# Patient Record
Sex: MALE
[De-identification: names, ages, dates, MRNs, and addresses within clinical notes are randomized per-mention and may not be internally consistent; named-entity substitution may affect disease eponyms.]

---

## 2017-10-30 ENCOUNTER — CHARTING TRANS (OUTPATIENT)
Dept: OTHER | Age: 37
End: 2017-10-30

## 2017-10-30 ENCOUNTER — LAB SERVICES (OUTPATIENT)
Dept: OTHER | Age: 37
End: 2017-10-30

## 2017-10-30 LAB
CHOLEST SERPL-MCNC: 167
CHOLEST/HDLC SERPL: 3.7
GLUCOSE: 77
HDLC SERPL-MCNC: 46
LDLC SERPL CALC-MCNC: 104
LENGTH OF FAST TIME PATIENT: NO
NONHDLC SERPL-MCNC: 121
TRIGL SERPL-MCNC: 86

## 2018-11-02 VITALS
BODY MASS INDEX: 24.19 KG/M2 | HEIGHT: 74 IN | WEIGHT: 188.47 LBS | RESPIRATION RATE: 18 BRPM | HEART RATE: 64 BPM | OXYGEN SATURATION: 97 % | TEMPERATURE: 98.1 F

## 2022-11-10 ENCOUNTER — APPOINTMENT (OUTPATIENT)
Dept: URBAN - METROPOLITAN AREA CLINIC 244 | Age: 42
Setting detail: DERMATOLOGY
End: 2022-11-13

## 2022-11-10 DIAGNOSIS — L82.1 OTHER SEBORRHEIC KERATOSIS: ICD-10-CM

## 2022-11-10 DIAGNOSIS — D22 MELANOCYTIC NEVI: ICD-10-CM

## 2022-11-10 DIAGNOSIS — L81.4 OTHER MELANIN HYPERPIGMENTATION: ICD-10-CM

## 2022-11-10 PROBLEM — D22.5 MELANOCYTIC NEVI OF TRUNK: Status: ACTIVE | Noted: 2022-11-10

## 2022-11-10 PROCEDURE — OTHER COUNSELING: OTHER

## 2022-11-10 PROCEDURE — 99203 OFFICE O/P NEW LOW 30 MIN: CPT

## 2022-11-10 ASSESSMENT — LOCATION SIMPLE DESCRIPTION DERM
LOCATION SIMPLE: CHEST
LOCATION SIMPLE: RIGHT UPPER BACK
LOCATION SIMPLE: LEFT UPPER BACK

## 2022-11-10 ASSESSMENT — LOCATION DETAILED DESCRIPTION DERM
LOCATION DETAILED: RIGHT SUPERIOR MEDIAL UPPER BACK
LOCATION DETAILED: UPPER STERNUM
LOCATION DETAILED: LEFT MEDIAL UPPER BACK

## 2022-11-10 ASSESSMENT — LOCATION ZONE DERM: LOCATION ZONE: TRUNK

## 2023-05-11 ENCOUNTER — APPOINTMENT (OUTPATIENT)
Dept: URBAN - METROPOLITAN AREA CLINIC 244 | Age: 43
Setting detail: DERMATOLOGY
End: 2023-05-14

## 2023-05-11 DIAGNOSIS — D22 MELANOCYTIC NEVI: ICD-10-CM

## 2023-05-11 DIAGNOSIS — L81.4 OTHER MELANIN HYPERPIGMENTATION: ICD-10-CM

## 2023-05-11 DIAGNOSIS — L82.1 OTHER SEBORRHEIC KERATOSIS: ICD-10-CM

## 2023-05-11 PROBLEM — D23.72 OTHER BENIGN NEOPLASM OF SKIN OF LEFT LOWER LIMB, INCLUDING HIP: Status: ACTIVE | Noted: 2023-05-11

## 2023-05-11 PROBLEM — D22.5 MELANOCYTIC NEVI OF TRUNK: Status: ACTIVE | Noted: 2023-05-11

## 2023-05-11 PROCEDURE — 99213 OFFICE O/P EST LOW 20 MIN: CPT

## 2023-05-11 PROCEDURE — OTHER COUNSELING: OTHER

## 2023-05-11 ASSESSMENT — LOCATION ZONE DERM: LOCATION ZONE: TRUNK

## 2023-05-11 ASSESSMENT — LOCATION DETAILED DESCRIPTION DERM
LOCATION DETAILED: RIGHT SUPERIOR MEDIAL UPPER BACK
LOCATION DETAILED: LEFT MEDIAL UPPER BACK
LOCATION DETAILED: UPPER STERNUM

## 2023-05-11 ASSESSMENT — LOCATION SIMPLE DESCRIPTION DERM
LOCATION SIMPLE: CHEST
LOCATION SIMPLE: RIGHT UPPER BACK
LOCATION SIMPLE: LEFT UPPER BACK

## 2023-10-19 ENCOUNTER — HOSPITAL ENCOUNTER (OUTPATIENT)
Age: 43
Discharge: HOME | End: 2023-10-19
Attending: EMERGENCY MEDICINE
Payer: COMMERCIAL

## 2023-10-19 ENCOUNTER — APPOINTMENT (OUTPATIENT)
Dept: GENERAL RADIOLOGY | Age: 43
End: 2023-10-19
Attending: EMERGENCY MEDICINE
Payer: COMMERCIAL

## 2023-10-19 VITALS
TEMPERATURE: 98 F | DIASTOLIC BLOOD PRESSURE: 72 MMHG | RESPIRATION RATE: 16 BRPM | OXYGEN SATURATION: 98 % | HEART RATE: 87 BPM | SYSTOLIC BLOOD PRESSURE: 110 MMHG

## 2023-10-19 DIAGNOSIS — S63.622A SPRAIN OF INTERPHALANGEAL JOINT OF LEFT THUMB, INITIAL ENCOUNTER: Primary | ICD-10-CM

## 2023-10-19 PROCEDURE — 73140 X-RAY EXAM OF FINGER(S): CPT | Performed by: EMERGENCY MEDICINE

## 2023-10-19 PROCEDURE — 99203 OFFICE O/P NEW LOW 30 MIN: CPT

## 2023-10-19 NOTE — ED INITIAL ASSESSMENT (HPI)
Patient arrives ambulatory with c/o left thumb injury.  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 +swelling

## 2024-06-28 ENCOUNTER — APPOINTMENT (OUTPATIENT)
Dept: URBAN - METROPOLITAN AREA CLINIC 244 | Age: 44
Setting detail: DERMATOLOGY
End: 2024-07-03

## 2024-06-28 DIAGNOSIS — D22 MELANOCYTIC NEVI: ICD-10-CM

## 2024-06-28 DIAGNOSIS — L82.1 OTHER SEBORRHEIC KERATOSIS: ICD-10-CM

## 2024-06-28 DIAGNOSIS — L81.4 OTHER MELANIN HYPERPIGMENTATION: ICD-10-CM

## 2024-06-28 PROBLEM — D22.5 MELANOCYTIC NEVI OF TRUNK: Status: ACTIVE | Noted: 2024-06-28

## 2024-06-28 PROBLEM — D23.72 OTHER BENIGN NEOPLASM OF SKIN OF LEFT LOWER LIMB, INCLUDING HIP: Status: ACTIVE | Noted: 2024-06-28

## 2024-06-28 PROCEDURE — 99213 OFFICE O/P EST LOW 20 MIN: CPT

## 2024-06-28 PROCEDURE — OTHER COUNSELING: OTHER

## 2024-06-28 ASSESSMENT — LOCATION DETAILED DESCRIPTION DERM
LOCATION DETAILED: LEFT MEDIAL UPPER BACK
LOCATION DETAILED: UPPER STERNUM
LOCATION DETAILED: RIGHT SUPERIOR MEDIAL UPPER BACK

## 2024-06-28 ASSESSMENT — LOCATION ZONE DERM: LOCATION ZONE: TRUNK

## 2024-06-28 ASSESSMENT — LOCATION SIMPLE DESCRIPTION DERM
LOCATION SIMPLE: CHEST
LOCATION SIMPLE: RIGHT UPPER BACK
LOCATION SIMPLE: LEFT UPPER BACK

## 2024-06-28 NOTE — HPI: EVALUATION OF SKIN LESION(S)
What Type Of Note Output Would You Prefer (Optional)?: Bullet Format
Hpi Title: Evaluation of a Skin Lesion
Family Member: Father, brother

## 2024-10-10 ENCOUNTER — OFFICE VISIT (OUTPATIENT)
Facility: LOCATION | Age: 44
End: 2024-10-10
Payer: COMMERCIAL

## 2024-10-10 VITALS — WEIGHT: 166 LBS | HEIGHT: 75 IN | BODY MASS INDEX: 20.64 KG/M2

## 2024-10-10 DIAGNOSIS — G43.809 VESTIBULAR MIGRAINE: Primary | ICD-10-CM

## 2024-10-10 DIAGNOSIS — R42 VERTIGO: ICD-10-CM

## 2024-10-10 PROCEDURE — 3008F BODY MASS INDEX DOCD: CPT | Performed by: OTOLARYNGOLOGY

## 2024-10-10 PROCEDURE — 99203 OFFICE O/P NEW LOW 30 MIN: CPT | Performed by: OTOLARYNGOLOGY

## 2024-10-10 RX ORDER — LISDEXAMFETAMINE DIMESYLATE 30 MG/1
CAPSULE ORAL
COMMUNITY
Start: 2024-10-03

## 2024-10-10 RX ORDER — MECLIZINE HYDROCHLORIDE 25 MG/1
TABLET ORAL
COMMUNITY
Start: 2024-10-08

## 2024-10-10 NOTE — PROGRESS NOTES
NEW PATIENT PROGRESS NOTE  OTOLOGY/OTOLARYNGOLOGY    REF MD:  No referring provider defined for this encounter.     PCP: No primary care provider on file.    CHIEF COMPLAINT:    Chief Complaint   Patient presents with    New Patient    Dizziness     Patient is here for dizziness symptoms, was diagnosed with Vertigo.       HISTORY OF PRESENT ILLNESS: Mynor Mckeon is a 44 year old male who presents for evaluation of vertigo and fatigue. Never experienced vertigo prior. Patient experienced an abrupt onset of dizziness starting Sunday morning, coinciding with a hangover from considerable alcohol consumption on Saturday. Endorses unsteadiness and abnormal motion perception, sometimes feeling like cars are moving towards him while driving. Patient has tinnitus, which has been present for years. He also experiences nausea and feels queasy.  Hit head during hockey 3 weeks ago. No ear fullness or pressure, nor in the head. There is no fever, headache, neck pain, blurred vision, weakness, or numbness. Symptoms worsen with head and postural changes but not with exertion. He plays hockey and has no history of dissection, aneurysm, or SAH.  Went to urgent care on 10/8/2024 and was prescribed meclizine. Continues to experience sensitivity to motion, feeling dizzy when turning his head, as if the world is moving at a different speed. No migraines or family history of migraines. Drinks alcohol every 2-3 weeks, with hangover symptoms including fatigue and feeling in a fog.  Has taken Vyvanse for the first time within the last week, because he was having difficulty concentration. Reports some stress at work. Patient states he drinks a lot of caffeine.    PAST MEDICAL HISTORY:  History reviewed. No pertinent past medical history.    PAST SURGICAL HISTORY:  History reviewed. No pertinent surgical history.    Medications Ordered Prior to Encounter[1]    Allergies: Allergies[2]    SOCIAL HISTORY:    Social History     Tobacco Use     Smoking status: Never    Smokeless tobacco: Never   Substance Use Topics    Alcohol use: Yes     Comment: social       FAMILY HISTORY: Denies known family history of hearing loss, tinnitus, vertigo, or migraine.  Denies known family history of head and neck cancer, thyroid cancer, bleeding disorders.     REVIEW OF SYSTEMS:   Positives are in bold  Neuro: Headache, facial weakness, facial numbness, neck pain, vertigo  ENT: Hearing change, tinnitus, otorrhea, otalgia, aural fullness, ear pressure, vertigo, imbalance  Sinus pressure, rhinorrhea, congestion, facial pain, jaw pain, dysphagia, odynophagia, sore throat, voice changes, shortness of breath    EXAMINATION:  I washed my hands with an alcohol-based hand gel prior to examination  Constitutional:   --Vitals: Height 6' 3\" (1.905 m), weight 166 lb (75.3 kg).  --General: no apparent distress, well-developed, conversant  Psych: affect pleasant and appropriate for age, alert and oriented  Neuro: Facial movement normal bilateral  Eyes: Pupils equal, symmetric and reactive to light.  Extra-ocular muscles intact  Respiratory: No stridor, stertor or increased work of breathing  ENT:  --Ear: The bilateral ears were examined under binocular microscopy  Right ear microscopic exam:  Pinna: Normal, no lesions or masses.  Mastoid: Nontender on palpation.   External auditory canal: Clear, no masses or lesions.  Tympanic membrane: Intact, no lesions, normal landmarks.  Middle ear: Aerated.    Left ear microscopic exam:  Pinna: Normal, no lesions or masses.  Mastoid: Nontender on palpation.   External auditory canal: Clear, no masses or lesions.  Tympanic membrane: Intact, no lesions, normal landmarks.  Middle ear: Aerated.    ASSESSMENT/PLAN:  Mynor Mckeon is a 44 year old male with     ICD-10-CM   1. Vestibular migraine  G43.809   2. Vertigo  R42        IMPRESSION:  Symptoms seem consistent with vestibular migraine. Current strong triggers include stress, alcohol consumption,  recent possible concussion, recent new medication (Vyvanse), and high caffeine consumption  Normal ear exam    PLAN:  -As this is the patient's first occurrence of vertigo, recommend implementing lifestyle changes before considering migraine preventative medication  -Lifestyle changes including stress reduction, migraine diet (caffeine cessation), sleep hygiene, gradual increase in hydration, regular meals discussed  -Recommend temporarily discontinuing Vyvanse, taking a break from hockey, and advising a couple of days off work until symptoms are under control.   -Patient education: Migraine: More than a Headache; this packet includes information regarding medication side effects  -Start magnesium oxide 200mg daily, may increase up to 400mg BID as needed. Please note this supplement can be laxative at higher doses.    -Symptom diary  -Follow-up in 2-3 weeks    Situation reviewed with the patient in detail.    Attention: This note has been scribed by Shania Peñaloza under the supervision of Bruce Ching MD.     Bruce Ching MD  Otology/[place] [address]  Phone [phone]  Fax [phone]      I have personally performed the services described in this documentation. All medical record entries made by the scribe were at my direction and in my presence. I have reviewed the chart and agree that the medical record reflects my personal performance and is accurate and complete.           [1]   Current Outpatient Medications on File Prior to Visit   Medication Sig Dispense Refill    lisdexamfetamine (VYVANSE) 30 MG Oral Cap       meclizine 25 MG Oral Tab       ibuprofen 200 MG Oral Tab Take 1 tablet (200 mg total) by mouth every 6 (six) hours as needed for Pain.       No current facility-administered medications on file prior to visit.   [2] No Known Allergies

## 2025-04-14 ENCOUNTER — HOSPITAL ENCOUNTER (OUTPATIENT)
Age: 45
Discharge: HOME | End: 2025-04-14
Payer: COMMERCIAL

## 2025-04-14 ENCOUNTER — APPOINTMENT (OUTPATIENT)
Dept: GENERAL RADIOLOGY | Age: 45
End: 2025-04-14
Attending: Physician Assistant
Payer: COMMERCIAL

## 2025-04-14 VITALS — OXYGEN SATURATION: 96 % | TEMPERATURE: 98 F | RESPIRATION RATE: 16 BRPM | HEART RATE: 73 BPM

## 2025-04-14 DIAGNOSIS — J06.9 VIRAL UPPER RESPIRATORY ILLNESS: Primary | ICD-10-CM

## 2025-04-14 LAB
POCT INFLUENZA A: NEGATIVE
POCT INFLUENZA B: NEGATIVE
SARS-COV-2 RNA RESP QL NAA+PROBE: NOT DETECTED

## 2025-04-14 PROCEDURE — 99214 OFFICE O/P EST MOD 30 MIN: CPT

## 2025-04-14 PROCEDURE — 87502 INFLUENZA DNA AMP PROBE: CPT | Performed by: PHYSICIAN ASSISTANT

## 2025-04-14 PROCEDURE — 71046 X-RAY EXAM CHEST 2 VIEWS: CPT | Performed by: PHYSICIAN ASSISTANT

## 2025-04-14 NOTE — ED INITIAL ASSESSMENT (HPI)
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  The next morning chills, fatigue and body aches.  Denies fevers.

## 2025-04-14 NOTE — DISCHARGE INSTRUCTIONS
Alternate Tylenol (acetaminophen) and Advil (ibuprofen) every 3 hours for pain or fever > 100.4 degrees  Drink plenty of fluids   Get plenty of rest     You may benefit from taking a decongestant (e.g. Sudafed or Mucinex)  You may benefit from taking a daily allergy medication (e.g. Zyrtec)    You may benefit from using a humidifier  Sleep with head elevated and avoid laying flat  Avoid having air blow on your face    Stay home and isolate from others in your home until you are fever-free (<100.4) for 24 hours and your symptoms are improving  Wash hands often  Disinfect your environment  Do not share utensils or drinks    Symptoms may take a few weeks to resolve  Follow up with your primary care provider

## 2025-04-14 NOTE — ED PROVIDER NOTES
Chief Complaint   Patient presents with    Cough     Entered by patient       History obtained from: patient    services not used    HPI:     Mynor Mckeon is a 44 year old male who presents with URI symptoms x 1-2 days. Patient endorses cough, fatigue, body aches, and chills. Patient states 3 days ago he was doing demolition at home and there was a lot of dust. Patient continues to eat and drink normally. Denies recorded fever, chest pain, shortness of breath, productive cough, hemoptysis, headache, ear pain, neck pain or stiffness, abdominal pain, vomiting.     PMH  Past Medical History[1]    PFSH    [facility] asessment screens reviewed and agree.  Nurses notes reviewed I agree with documentation.    Family History[2]  Family history reviewed with patient/caregiver and is not pertinent to presenting problem.  Social History     Socioeconomic History    Marital status:      Spouse name: Not on file    Number of children: Not on file    Years of education: Not on file    Highest education level: Not on file   Occupational History    Not on file   Tobacco Use    Smoking status: Never    Smokeless tobacco: Never   Vaping Use    Vaping status: Never Used   Substance and Sexual Activity    Alcohol use: Yes     Comment: social    Drug use: Never    Sexual activity: Not on file   Other Topics Concern    Not on file   Social History Narrative    Not on file     Social Drivers of Health     Food Insecurity: Not on file   Transportation Needs: Not on file   Housing Stability: Low Risk  (4/20/2024)    Received from [facility]    Housing Stability     Mortgage Payment Concerns?: Not on file     Number of Places Lived in the Last Year: Not on file     Unstable Housing?: Not on file         ROS:   Positive for stated complaint: cough, body aches, fatigue, chills   Other systems are as noted in HPI.   All other systems reviewed and negative except as noted above.    Physical Exam:   Vital signs  and nursing note reviewed.       BP (P) 123/72   Pulse 73   Temp 97.9 °F (36.6 °C) (Oral)   Resp 16   SpO2 96%     GENERAL: well developed, no acute distress, non-toxic appearing   SKIN: good skin turgor, no obvious rashes  HEAD: normocephalic, atraumatic  EYES: sclera non-icteric bilaterally, conjunctiva clear bilaterally  EARS: canals clear bilaterally, TMs clear bilaterally  NOSE: nasal turbinates pink, normal mucosa  OROPHARYNX: MMM, pharynx clear, no exudates or swelling, uvula midline, no tongue elevation, maintaining airway and secretions  NECK: supple, no lymphadenopathy, no nuchal rigidity, no trismus, no edema, phonation normal    CARDIO: RRR, normal heart sounds   LUNGS: clear to auscultation bilaterally, no increased WOB, no rales, rhonchi, or wheezes, occasional coughing   EXTREMITIES: no cyanosis or edema, QUINTERO without difficulty  NEURO: no focal deficits  PSYCH: alert and oriented x3, answering questions appropriately, mood appropriate    MDM/Assessment/Plan:   Orders for this encounter:    Orders Placed This Encounter    XR CHEST PA + LAT CHEST (CPT=71046)     Cough On Friday doing demolition in his basement, shortly after developed cough.    The next morning chills, fatigue and body aches.  Denies fevers.        What is the Relevant Clinical Indication / Reason for Exam?:   Cough     Release to patient:   Immediate    Rapid SARS-CoV-2 by PCR     Release to patient:   Immediate    POCT Flu Test     Release to patient:   Immediate       Labs performed this visit:  Recent Results (from the past 10 hours)   Rapid SARS-CoV-2 by PCR    Collection Time: 04/14/25  9:26 AM    Specimen: Nares; Other   Result Value Ref Range    Rapid SARS-CoV-2 by PCR Not Detected Not Detected   POCT Flu Test    Collection Time: 04/14/25  9:26 AM    Specimen: Nares; Other   Result Value Ref Range    POCT INFLUENZA A Negative Negative    POCT INFLUENZA B Negative Negative       Imaging performed this visit:  XR CHEST PA +  LAT CHEST (KBG=23716)   Final Result   PROCEDURE: XR CHEST PA + LAT CHEST (CPT=71046)       COMPARISON: None.       INDICATIONS: Productive cough, body aches and sore throat x3 days.       TECHNIQUE:   Two views.         FINDINGS:    CARDIAC/VASC: Normal.  No cardiac silhouette abnormality or cardiomegaly.     Unremarkable pulmonary vasculature.     MEDIAST/OLEG: No visible mass or adenopathy.    LUNGS/PLEURA: Normal.  No significant pulmonary parenchymal abnormalities.     No effusion or pleural thickening.     BONES: No fracture or visible bony lesion.    OTHER: Negative.                      =====   CONCLUSION: No acute cardiopulmonary process.               Dictated by (CST): Pablito Arizmendi MD on 4/14/2025 at 9:50 AM        Finalized by (CST): Pablito Arizmendi MD on 4/14/2025 at 9:50 AM                   Medical Decision Making  DDx includes viral URI versus covid versus flu versus bronchitis versus aspiration pneumonia versus other. Patient is overall very well-appearing with stable vitals and tolerating oral intake. No hypoxia or signs of respiratory distress. Covid and flu tests negative.  Chest x-ray reviewed, no evidence of acute cardiopulmonary process.  Discussed supportive care for suspected viral illness including rest, increased fluid intake, and OTC Tylenol/Motrin as needed for pain or fevers.  Discussed infection control.  Instructed patient to go directly to nearest ER with any worsening or concerning symptoms.  Follow-up with PCP.    Amount and/or Complexity of Data Reviewed  Labs: ordered.  Radiology: ordered and independent interpretation performed.    Risk  OTC drugs.          Diagnosis:    ICD-10-CM    1. Viral upper respiratory illness  J06.9           All results reviewed and discussed with patient/patient's family. Patient/patient's family verbalize excellent understanding of instructions and feels comfortable with plan. All of patient's/patient's family's questions were addressed.   See  AVS for detailed discharge instructions for your condition today.    Follow Up with:  Aniyah Posada MD  [address] [place] [address]  [phone]    Schedule an appointment as soon as possible for a visit   New primary care provider      Note: This document was dictated using Dragon medical dictation software.  Proofreading was performed to the best of my ability, but errors may be present.    Nela Reich PA-C       [1] History reviewed. No pertinent past medical history.  [2]   Family History  Problem Relation Age of Onset    Cancer Other     Diabetes Other     Heart Disorder Other

## 2025-07-17 ENCOUNTER — APPOINTMENT (OUTPATIENT)
Dept: URBAN - METROPOLITAN AREA CLINIC 244 | Age: 45
Setting detail: DERMATOLOGY
End: 2025-07-17

## 2025-07-17 DIAGNOSIS — L81.4 OTHER MELANIN HYPERPIGMENTATION: ICD-10-CM

## 2025-07-17 DIAGNOSIS — L82.1 OTHER SEBORRHEIC KERATOSIS: ICD-10-CM

## 2025-07-17 DIAGNOSIS — R23.3 SPONTANEOUS ECCHYMOSES: ICD-10-CM

## 2025-07-17 DIAGNOSIS — D22 MELANOCYTIC NEVI: ICD-10-CM

## 2025-07-17 DIAGNOSIS — Z12.83 ENCOUNTER FOR SCREENING FOR MALIGNANT NEOPLASM OF SKIN: ICD-10-CM

## 2025-07-17 PROBLEM — D22.9 MELANOCYTIC NEVI, UNSPECIFIED: Status: ACTIVE | Noted: 2025-07-17

## 2025-07-17 PROBLEM — D23.72 OTHER BENIGN NEOPLASM OF SKIN OF LEFT LOWER LIMB, INCLUDING HIP: Status: ACTIVE | Noted: 2025-07-17

## 2025-07-17 PROCEDURE — OTHER COUNSELING: OTHER

## 2025-07-17 PROCEDURE — 99213 OFFICE O/P EST LOW 20 MIN: CPT

## 2025-07-17 ASSESSMENT — LOCATION ZONE DERM
LOCATION ZONE: ARM
LOCATION ZONE: LEG

## 2025-07-17 ASSESSMENT — LOCATION SIMPLE DESCRIPTION DERM
LOCATION SIMPLE: LEFT SHOULDER
LOCATION SIMPLE: RIGHT KNEE

## 2025-07-17 ASSESSMENT — LOCATION DETAILED DESCRIPTION DERM
LOCATION DETAILED: LEFT ANTERIOR SHOULDER
LOCATION DETAILED: RIGHT LATERAL KNEE